# Patient Record
Sex: FEMALE | Race: WHITE | NOT HISPANIC OR LATINO | URBAN - METROPOLITAN AREA
[De-identification: names, ages, dates, MRNs, and addresses within clinical notes are randomized per-mention and may not be internally consistent; named-entity substitution may affect disease eponyms.]

---

## 2018-04-04 ENCOUNTER — TELEPHONE (OUTPATIENT)
Dept: OBSTETRICS AND GYNECOLOGY | Facility: CLINIC | Age: 53
End: 2018-04-04

## 2021-07-22 ENCOUNTER — TRANSCRIBE ORDERS (OUTPATIENT)
Dept: SCHEDULING | Age: 56
End: 2021-07-22

## 2021-07-22 DIAGNOSIS — M21.70 UNEQUAL LIMB LENGTH (ACQUIRED), UNSPECIFIED SITE: Primary | ICD-10-CM

## 2021-07-26 ENCOUNTER — HOSPITAL ENCOUNTER (OUTPATIENT)
Dept: RADIOLOGY | Facility: HOSPITAL | Age: 56
Discharge: HOME | End: 2021-07-26
Attending: PODIATRIST
Payer: COMMERCIAL

## 2021-07-26 DIAGNOSIS — M21.70 UNEQUAL LIMB LENGTH (ACQUIRED), UNSPECIFIED SITE: ICD-10-CM

## 2021-07-26 PROCEDURE — 77073 BONE LENGTH STUDIES: CPT

## 2023-10-28 ENCOUNTER — APPOINTMENT (EMERGENCY)
Dept: RADIOLOGY | Facility: HOSPITAL | Age: 58
End: 2023-10-28
Payer: COMMERCIAL

## 2023-10-28 ENCOUNTER — HOSPITAL ENCOUNTER (EMERGENCY)
Facility: HOSPITAL | Age: 58
Discharge: HOME | End: 2023-10-28
Attending: FAMILY MEDICINE
Payer: COMMERCIAL

## 2023-10-28 VITALS
TEMPERATURE: 97.1 F | SYSTOLIC BLOOD PRESSURE: 144 MMHG | HEIGHT: 68 IN | HEART RATE: 79 BPM | WEIGHT: 135 LBS | OXYGEN SATURATION: 98 % | DIASTOLIC BLOOD PRESSURE: 71 MMHG | RESPIRATION RATE: 18 BRPM | BODY MASS INDEX: 20.46 KG/M2

## 2023-10-28 DIAGNOSIS — S62.624A: Primary | ICD-10-CM

## 2023-10-28 PROCEDURE — 73130 X-RAY EXAM OF HAND: CPT

## 2023-10-28 PROCEDURE — 99283 EMERGENCY DEPT VISIT LOW MDM: CPT

## 2023-10-28 RX ORDER — IBUPROFEN 600 MG/1
600 TABLET ORAL ONCE
Status: COMPLETED | OUTPATIENT
Start: 2023-10-28 | End: 2023-10-28

## 2023-10-28 RX ORDER — HYDROXYCHLOROQUINE SULFATE 200 MG/1
300 TABLET, FILM COATED ORAL 2 TIMES DAILY WITH MEALS
COMMUNITY

## 2023-10-28 RX ADMIN — IBUPROFEN 600 MG: 600 TABLET, FILM COATED ORAL at 15:22

## 2023-10-28 NOTE — Clinical Note
Herminio Nantucket was seen and treated in our emergency department on 10/28/2023. Diagnosis:     Stefaniad Chloe  . She may return on this date: If you have any questions or concerns, please don't hesitate to call.       Yayo Tong RN    ______________________________           _______________          _______________  [facility] Representative                              Date                                Time

## 2023-10-28 NOTE — Clinical Note
Johnnie Martinez was seen and treated in our emergency department on 10/28/2023. Other - See Comments        Diagnosis:     Myah Wheatley  . She may return on this date: 11/06/2023    No using right hand until cleared by orthopedics. If you have any questions or concerns, please don't hesitate to call.       Brian Olson MD    ______________________________           _______________          _______________  Hospital Representative                              Date                                Time

## 2023-10-28 NOTE — Clinical Note
Jeff Perry was seen and treated in our emergency department on 10/28/2023. Other - See Comments        Diagnosis:     Soy Chamorro  . She may return on this date: 11/06/2023    No using right hand until cleared by orthopedics. If you have any questions or concerns, please don't hesitate to call.       Hernando Orozco MD    ______________________________           _______________          _______________  Hospital Representative                              Date                                Time
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
## 2023-10-30 ENCOUNTER — TELEPHONE (OUTPATIENT)
Age: 58
End: 2023-10-30

## 2023-10-30 NOTE — TELEPHONE ENCOUNTER
Patient is being referred to a orthopedics. Please schedule accordingly.     [address]   [phone]

## 2023-10-31 ENCOUNTER — HOSPITAL ENCOUNTER (OUTPATIENT)
Facility: HOSPITAL | Age: 58
Setting detail: HOSPITAL OUTPATIENT SURGERY
End: 2023-10-31
Attending: ORTHOPAEDIC SURGERY | Admitting: ORTHOPAEDIC SURGERY
Payer: COMMERCIAL

## 2025-05-07 ENCOUNTER — TELEPHONE (OUTPATIENT)
Dept: GASTROENTEROLOGY | Facility: CLINIC | Age: 60
End: 2025-05-07
Payer: COMMERCIAL